# Patient Record
Sex: FEMALE | Race: OTHER | URBAN - METROPOLITAN AREA
[De-identification: names, ages, dates, MRNs, and addresses within clinical notes are randomized per-mention and may not be internally consistent; named-entity substitution may affect disease eponyms.]

---

## 2022-05-02 ENCOUNTER — VIRTUAL VISIT (OUTPATIENT)
Dept: FAMILY MEDICINE CLINIC | Age: 37
End: 2022-05-02

## 2022-05-02 DIAGNOSIS — J39.2 THROAT IRRITATION: ICD-10-CM

## 2022-05-02 DIAGNOSIS — E03.9 HYPOTHYROIDISM, UNSPECIFIED TYPE: Primary | ICD-10-CM

## 2022-05-02 DIAGNOSIS — R14.0 ABDOMINAL BLOATING: ICD-10-CM

## 2022-05-02 PROCEDURE — 99441 PR PHYS/QHP TELEPHONE EVALUATION 5-10 MIN: CPT | Performed by: FAMILY MEDICINE

## 2022-05-02 NOTE — PROGRESS NOTES
Laura Hawkins (: 1985) is a 31 y.o. female, new patient, Virtual Visit for evaluation of the following chief complaint(s):   Sore Throat (x2 month's mild. medication does not improve it.) and Abdominal Pain (x1 week. feels bloated.)       ASSESSMENT/PLAN:  1. Hypothyroidism, unspecified type  She is unsure if it was low or high thyroid. Would like to have these rechecked. Her throat irritation and abdominal bloating are mild but she is wondering if it could be from her thyroid problem. Will have her follow up for exam and labs. 2. Throat irritation  Very mild, will follow up for exam.  Consider allergies. 3. Abdominal bloating  More acute without associated severe sx. Follow up for exam and labs. Return for follow up for F2F next available for exam and labs. .    SUBJECTIVE/OBJECTIVE:  HPI    Sore Throat:  Mild throat irritation, most of the time x 2 months. No cough, nasal congestion, difficulty swallowing, acid reflux. Abdominal bloatin week of feeling bloated off/on. Worse at night. Rarely has nausea. No fever, diarrhea, constipation. Thyroid problem: Dx w labs 10/2020, took medication for a while but stopped over a year ago. Labs were here in [address] at another clinic. FHx:  DM, HTN  PMHx:  Cholecystectomy ()  No pregnancies. Review of Systems     Patient-Reported LMP: 24 approxiamtely. Physical Exam    On this date 2022 I have spent 10 minutes reviewing previous notes, test results and face to face (virtual) with the patient discussing the diagnosis and importance of compliance with the treatment plan as well as documenting on the day of the visit. Laura Hawkins, was evaluated through a synchronous (real-time) audio-video encounter. The patient (or guardian if applicable) is aware that this is a billable service, which includes applicable co-pays. Verbal consent to proceed has been obtained.  The visit was conducted pursuant to the emergency declaration under the [facility], [facility], [facility] authority and the [employer] Act. Patient identification was verified, and a caregiver was present when appropriate. The patient was located at home in a state where the provider was licensed to provide care. Patient identification was verified at the start of the visit: YES    Services were provided through a phone synchronous discussion virtually to substitute for in-person clinic visit. Patient was located at home and provider was located in office or at home. An electronic signature was used to authenticate this note.   -- Faustino Peters MD

## 2022-05-02 NOTE — PROGRESS NOTES
Intake with [facility]  # [id number]. No vs equipment is available. Pt stated she is not taking any medication. The pt has a hx of taking Levothyroxine. She stated she did not have a Dr and stopped taking the medication.

## 2022-08-09 ENCOUNTER — OFFICE VISIT (OUTPATIENT)
Dept: FAMILY MEDICINE CLINIC | Age: 37
End: 2022-08-09

## 2022-08-09 ENCOUNTER — HOSPITAL ENCOUNTER (OUTPATIENT)
Dept: LAB | Age: 37
Discharge: HOME | End: 2022-08-09

## 2022-08-09 VITALS
TEMPERATURE: 97.7 F | WEIGHT: 157 LBS | SYSTOLIC BLOOD PRESSURE: 123 MMHG | OXYGEN SATURATION: 97 % | BODY MASS INDEX: 31.65 KG/M2 | DIASTOLIC BLOOD PRESSURE: 77 MMHG | HEART RATE: 75 BPM | HEIGHT: 59 IN

## 2022-08-09 DIAGNOSIS — G44.41 INTRACTABLE DRUG-INDUCED HEADACHE, NOT ELSEWHERE CLASSIFIED: ICD-10-CM

## 2022-08-09 DIAGNOSIS — E03.9 HYPOTHYROIDISM, UNSPECIFIED TYPE: ICD-10-CM

## 2022-08-09 DIAGNOSIS — E03.9 HYPOTHYROIDISM, UNSPECIFIED TYPE: Primary | ICD-10-CM

## 2022-08-09 LAB
ALBUMIN SERPL-MCNC: 4.3 G/DL (ref 3.5–5)
ALBUMIN/GLOB SERPL: 1.3 {RATIO} (ref 1.1–2.2)
ALP SERPL-CCNC: 121 U/L (ref 45–117)
ALT SERPL-CCNC: 78 U/L (ref 12–78)
ANION GAP SERPL CALC-SCNC: 5 MMOL/L (ref 5–15)
AST SERPL-CCNC: 26 U/L (ref 15–37)
BASOPHILS # BLD: 0 K/UL (ref 0–0.1)
BASOPHILS NFR BLD: 0 % (ref 0–1)
BILIRUB SERPL-MCNC: 0.3 MG/DL (ref 0.2–1)
BUN SERPL-MCNC: 17 MG/DL (ref 6–20)
BUN/CREAT SERPL: 18 (ref 12–20)
CALCIUM SERPL-MCNC: 9.4 MG/DL (ref 8.5–10.1)
CHLORIDE SERPL-SCNC: 109 MMOL/L (ref 97–108)
CO2 SERPL-SCNC: 24 MMOL/L (ref 21–32)
CREAT SERPL-MCNC: 0.96 MG/DL (ref 0.55–1.02)
DIFFERENTIAL METHOD BLD: NORMAL
EOSINOPHIL # BLD: 0.2 K/UL (ref 0–0.4)
EOSINOPHIL NFR BLD: 3 % (ref 0–7)
ERYTHROCYTE [DISTWIDTH] IN BLOOD BY AUTOMATED COUNT: 11.9 % (ref 11.5–14.5)
GLOBULIN SER CALC-MCNC: 3.4 G/DL (ref 2–4)
GLUCOSE SERPL-MCNC: 93 MG/DL (ref 65–100)
HCT VFR BLD AUTO: 42.7 % (ref 35–47)
HGB BLD-MCNC: 14.1 G/DL (ref 11.5–16)
IMM GRANULOCYTES # BLD AUTO: 0 K/UL (ref 0–0.04)
IMM GRANULOCYTES NFR BLD AUTO: 0 % (ref 0–0.5)
LYMPHOCYTES # BLD: 2.2 K/UL (ref 0.8–3.5)
LYMPHOCYTES NFR BLD: 36 % (ref 12–49)
MCH RBC QN AUTO: 31.6 PG (ref 26–34)
MCHC RBC AUTO-ENTMCNC: 33 G/DL (ref 30–36.5)
MCV RBC AUTO: 95.7 FL (ref 80–99)
MONOCYTES # BLD: 0.5 K/UL (ref 0–1)
MONOCYTES NFR BLD: 8 % (ref 5–13)
NEUTS SEG # BLD: 3.3 K/UL (ref 1.8–8)
NEUTS SEG NFR BLD: 53 % (ref 32–75)
NRBC # BLD: 0 K/UL (ref 0–0.01)
NRBC BLD-RTO: 0 PER 100 WBC
PLATELET # BLD AUTO: 389 K/UL (ref 150–400)
PMV BLD AUTO: 8.9 FL (ref 8.9–12.9)
POTASSIUM SERPL-SCNC: 4.6 MMOL/L (ref 3.5–5.1)
PROT SERPL-MCNC: 7.7 G/DL (ref 6.4–8.2)
RBC # BLD AUTO: 4.46 M/UL (ref 3.8–5.2)
SODIUM SERPL-SCNC: 138 MMOL/L (ref 136–145)
TSH SERPL DL<=0.05 MIU/L-ACNC: 3.54 UIU/ML (ref 0.36–3.74)
WBC # BLD AUTO: 6.1 K/UL (ref 3.6–11)

## 2022-08-09 PROCEDURE — 84443 ASSAY THYROID STIM HORMONE: CPT

## 2022-08-09 PROCEDURE — 80053 COMPREHEN METABOLIC PANEL: CPT

## 2022-08-09 PROCEDURE — 85025 COMPLETE CBC W/AUTO DIFF WBC: CPT

## 2022-08-09 PROCEDURE — 99214 OFFICE O/P EST MOD 30 MIN: CPT | Performed by: NURSE PRACTITIONER

## 2022-08-09 NOTE — PROGRESS NOTES
I have printed AVS and reviewed it with patient today. Patient verbalized understanding. I instructed patient to schedule a follow-up appointment prior to leaving today. Patient verbalized understanding. I explained to the patient to bring the vitamins that help her headaches per Dr. Rianna Sampson. Patient verbalized understanding. Patient correctly stated her full name and date of birth prior to the information shared.  [id number] with the [facility] services assisted with this discharge.   Bertha Rodriguez RN

## 2022-08-09 NOTE — PROGRESS NOTES
Coordination of Care  1. Have you been to the ER, urgent care clinic since your last visit? Hospitalized since your last visit? No    2. Have you seen or consulted any other health care providers outside of the [address] [place] [address] since your last visit? Include any pap smears or colon screening. No    Does the patient need refills? NO    Learning Assessment Complete?  yes  Depression Screening complete in the past 12 months? yes

## 2022-08-09 NOTE — PROGRESS NOTES
2022 : Jayshree Torres (: 1985) is a 30 y.o. female,  established patient, here for evaluation of the following chief complaint(s): Other (Please check depression screening), Thyroid Problem (This is a new patient with Hx of Thyroid since 10/2021. Pt is not taking medicines. Pt had VV on  with Dr Ashly Hickman), and Headache (X several years)     ASSESSMENT/PLAN:  Below is the assessment and plan developed based on review of pertinent history, physical exam, labs, studies, and medications. 1. Hypothyroidism, unspecified type  -     TSH 3RD GENERATION; Future  -     CBC WITH AUTOMATED DIFF; Future  -     METABOLIC PANEL, COMPREHENSIVE; Future  2. Intractable drug-induced headache, not elsewhere classified  Return for LK F2F 3-4 weeks (thyroid, headaches). Differential would include medication overuse headache (daily use of Tylenol or Ibuprofen). PHQ 2 = 2.  SUBJECTIVE/OBJECTIVE:  HPI   She bought some vitamins that she thinks reduces the headaches but headaches return when she stops them. Headaches  For many years. Having them more frequently. Also having pain down in the anterior neck. Headache may be on the right or on the left. Also it goes to the back of the head. Pain is up to 8/10. Right now it is 2/10. She takes tylenol or ibuprofen. Sometimes has headache for a whole week or 2 days out of a week. Has not started or stopped bcp. Drinks 2 cups coffee a day. No sodas. 2 years ago she was drinking more than 2 cups of coffee a day. Headaches worse at the end of the day. Has coffee in am and pm.  Previously the evening coffee helped but more recently it has not. Takes tylenol or ibuprofen more than once a day. Sleeps well. But still feels tired in the mornings. No difficulty swallowing. No enlargement of thyroid noted. No sneezing, watery eyes. Thyroid  Throat pain for 2 months. Thought it was her AC. Felt run down, low energy. Severe headaches.   Had some blood tests done. Founnd out there was a problem with her thyroid. No results found for any visits on 08/09/22. Review of Systems: Negative for: fever, chest pain, shortness of breath, leg swelling. Social History:  reports that she has never smoked. She has never used smokeless tobacco. She reports that she does not drink alcohol and does not use drugs. Current Medications:   No current outpatient medications on file. Physical Examination: Patient's last menstrual period was 07/15/2022. Blood pressure 123/77, pulse 75, temperature 97.7 °F (36.5 °C), temperature source Temporal, height 4' 10.86\" (1.495 m), weight 157 lb (71.2 kg), last menstrual period 07/15/2022, SpO2 97 %. General appearance - well developed, no acute distress. Oropharynx - + postnasal drainage noted. No thyromegaly or nodules noted, no adenopathy. Chest - clear to auscultation. Heart - regular rate and rhythm without murmurs, rubs, or gallops. Abdomen - bowel sounds present x 4, NT, ND  Extremities - no CCE. CN II-XII grossly intact. An electronic signature was used to authenticate this note.   -- Vladislav Vigil NP

## 2022-09-02 ENCOUNTER — OFFICE VISIT (OUTPATIENT)
Dept: FAMILY MEDICINE CLINIC | Age: 37
End: 2022-09-02

## 2022-09-02 VITALS
OXYGEN SATURATION: 99 % | DIASTOLIC BLOOD PRESSURE: 78 MMHG | WEIGHT: 160 LBS | HEIGHT: 59 IN | HEART RATE: 63 BPM | BODY MASS INDEX: 32.25 KG/M2 | SYSTOLIC BLOOD PRESSURE: 120 MMHG | TEMPERATURE: 98.2 F

## 2022-09-02 DIAGNOSIS — G43.709 CHRONIC MIGRAINE WITHOUT AURA WITHOUT STATUS MIGRAINOSUS, NOT INTRACTABLE: Primary | ICD-10-CM

## 2022-09-02 PROCEDURE — 99214 OFFICE O/P EST MOD 30 MIN: CPT | Performed by: NURSE PRACTITIONER

## 2022-09-02 RX ORDER — AMITRIPTYLINE HYDROCHLORIDE 10 MG/1
10 TABLET, FILM COATED ORAL
Qty: 30 TABLET | Refills: 1 | Status: SHIPPED | OUTPATIENT
Start: 2022-09-02 | End: 2022-10-14 | Stop reason: SDUPTHER

## 2022-09-02 NOTE — PROGRESS NOTES
Subjective  Meaghan Allen is a 30 y.o. female. Here for thyroid and headache. Patient states that she is still experiencing headaches. Pain is located on the right side of her head. Headaches occur 3 to 4 times a day and last all day. Pain radiates down the back of her neck and is accompanied by nausea. Patient has been taking Tylenol 500 mg PO for headaches and Neuro [employer] vitamin B-B6-B12 that she recieves from the [employer]". Patient stated she use to were glasses but does not currently. Does not see an opthalmologist currently     Review of Systems   Constitutional:  Negative for diaphoresis, fever and malaise/fatigue. HENT:  Negative for congestion and sinus pain. Eyes:  Positive for blurred vision. Negative for photophobia and pain. Respiratory:  Negative for shortness of breath. Cardiovascular:  Negative for chest pain and palpitations. Gastrointestinal:  Positive for nausea. Negative for vomiting. Musculoskeletal:  Positive for neck pain. Neurological:  Positive for headaches. Negative for dizziness, sensory change, speech change, loss of consciousness and weakness. Psychiatric/Behavioral:  Negative for depression and memory loss. The patient is not nervous/anxious. Objective  Blood pressure 120/78, pulse 63, temperature 98.2 °F (36.8 °C), temperature source Temporal, height 4' 10.86\" (1.495 m), weight 160 lb (72.6 kg), last menstrual period 08/18/2022, SpO2 99 %. Physical Exam  Constitutional:       Appearance: Normal appearance. She is normal weight. HENT:      Head: Normocephalic. Cardiovascular:      Rate and Rhythm: Normal rate and regular rhythm. Heart sounds: Normal heart sounds. Pulmonary:      Effort: Pulmonary effort is normal.      Breath sounds: Normal breath sounds. Musculoskeletal:         General: No tenderness. Cervical back: No tenderness. Neurological:      General: No focal deficit present.       Mental Status: She is alert and oriented to person, place, and time. Cranial Nerves: No cranial nerve deficit. Sensory: No sensory deficit. Motor: No weakness. Coordination: Coordination normal.      Gait: Gait normal.   Psychiatric:         Mood and Affect: Mood normal.         Thought Content: Thought content normal.     Lab Results   Component Value Date/Time    TSH 3.54 08/09/2022 12:00 PM       Assessment & Plan    ICD-10-CM ICD-9-CM    1. Chronic migraine without aura without status migrainosus, not intractable  G43.709 346.70 amitriptyline (ELAVIL) 10 mg tablet        Encounter Diagnoses   Name Primary? Chronic migraine without aura without status migrainosus, not intractable Yes   Patient given resources to Opthalmologist for eye exam  Follow up in 4 weeks for headache    Burnie Saint, NP  I personally saw and examined the patient. I have added to the physical exam:  Neck:  No thyromegaly, no thyroid masses, no thyroid tenderness. No cervical adenopathy. I have reviewed and agree with the NP student's findings, including all diagnostic interpretations, and plans as written. I was present during the key portions of separately billed procedures.

## 2022-09-02 NOTE — PROGRESS NOTES
Coordination of Care  1. Have you been to the ER, urgent care clinic since your last visit? Hospitalized since your last visit? No    2. Have you seen or consulted any other health care providers outside of the [address] [place] [address] since your last visit? Include any pap smears or colon screening. No    Does the patient need refills? no    Learning Assessment Complete?  yes  Depression Screening complete in the past 12 months? yes

## 2022-09-02 NOTE — PROGRESS NOTES
AVS printed and with the assistance of [employer] interpretor #[id number], reviewed with patient. Per Ritu's instructions, patient given copy of inexpensive vision resources. Advised patient that new medication sent to [facility] and no coupon is needed. Advised patient to follow-up with Jesse Mcclendon in 1 month for migraines. Patient indicated understanding and appreciated the service today.

## 2022-09-02 NOTE — PROGRESS NOTES
I personally saw and examined the patient. I have added to the physical exam:  Neck:  No thyromegaly, no thyroid masses, no thyroid tenderness. No cervical adenopathy. I have reviewed and agree with the NP student's findings, including all diagnostic interpretations, and plans as written. I was present during the key portions of separately billed procedures. Kimberly Boss NP    Diagnoses and all orders for this visit:    1. Chronic migraine without aura without status migrainosus, not intractable  -     amitriptyline (ELAVIL) 10 mg tablet; Take 1 Tablet by mouth nightly. To prevent migraines. Blood pressure 120/78, pulse 63, temperature 98.2 °F (36.8 °C), temperature source Temporal, height 4' 10.86\" (1.495 m), weight 160 lb (72.6 kg), last menstrual period 08/18/2022, SpO2 99 %. Education: discussed thyroid hormone testing was normal.  No treatment is needed. Glucose < 100, no diabetes.   Lab Results   Component Value Date/Time    Glucose 93 08/09/2022 12:00 PM     Lab Results   Component Value Date/Time    TSH 3.54 08/09/2022 12:00 PM         Kimberly Boss NP  Return F2F LK 4 weeks

## 2022-10-14 ENCOUNTER — OFFICE VISIT (OUTPATIENT)
Dept: FAMILY MEDICINE CLINIC | Age: 37
End: 2022-10-14

## 2022-10-14 VITALS
BODY MASS INDEX: 33.28 KG/M2 | TEMPERATURE: 97.9 F | HEART RATE: 55 BPM | SYSTOLIC BLOOD PRESSURE: 113 MMHG | OXYGEN SATURATION: 98 % | DIASTOLIC BLOOD PRESSURE: 83 MMHG | WEIGHT: 164 LBS

## 2022-10-14 DIAGNOSIS — G43.709 CHRONIC MIGRAINE WITHOUT AURA WITHOUT STATUS MIGRAINOSUS, NOT INTRACTABLE: Primary | ICD-10-CM

## 2022-10-14 DIAGNOSIS — Z13.9 ENCOUNTER FOR SCREENING: ICD-10-CM

## 2022-10-14 DIAGNOSIS — Z23 ENCOUNTER FOR IMMUNIZATION: ICD-10-CM

## 2022-10-14 LAB
HCG URINE, QL. (POC): NEGATIVE
VALID INTERNAL CONTROL?: YES

## 2022-10-14 PROCEDURE — 81025 URINE PREGNANCY TEST: CPT | Performed by: NURSE PRACTITIONER

## 2022-10-14 PROCEDURE — 90686 IIV4 VACC NO PRSV 0.5 ML IM: CPT

## 2022-10-14 PROCEDURE — 99214 OFFICE O/P EST MOD 30 MIN: CPT | Performed by: NURSE PRACTITIONER

## 2022-10-14 PROCEDURE — 90471 IMMUNIZATION ADMIN: CPT

## 2022-10-14 RX ORDER — AMITRIPTYLINE HYDROCHLORIDE 10 MG/1
10 TABLET, FILM COATED ORAL
Qty: 30 TABLET | Refills: 2 | Status: SHIPPED | OUTPATIENT
Start: 2022-10-14

## 2022-10-14 NOTE — PROGRESS NOTES
10/14/2022 : Britton Eaton (: 1985) is a 30 y.o. female,  established patient, here for evaluation of the following chief complaint(s):  Migraine (Follow up)     ASSESSMENT/PLAN:  Below is the assessment and plan developed based on review of pertinent history, physical exam, labs, studies, and medications. 1. Chronic migraine without aura without status migrainosus, not intractable  -     amitriptyline (ELAVIL) 10 mg tablet; Take 1 Tablet by mouth nightly. To prevent migraines. , Normal, Disp-30 Tablet, R-2  2. Encounter for screening  -     AMB POC URINE PREGNANCY TEST, VISUAL COLOR COMPARISON  3. Encounter for immunization  -     INFLUENZA, FLUARIX, FLULAVAL, FLUZONE (AGE 6 MO+), AFLURIA(AGE 3Y+) IM, PF, 0.5 ML    No follow-ups on file. SUBJECTIVE/OBJECTIVE:  HPI Migraines are better, sleeping is good. She had almost every day migraines. This month 4 consecutive days. She is trying to be pregnant. Results for orders placed or performed in visit on 10/14/22   AMB POC URINE PREGNANCY TEST, VISUAL COLOR COMPARISON   Result Value Ref Range    VALID INTERNAL CONTROL POC Yes     HCG urine, Ql. (POC) Negative Negative     Review of Systems: Negative for: fever, chest pain, shortness of breath, leg swelling. Social History:  reports that she has never smoked. She has never used smokeless tobacco. She reports that she does not drink alcohol and does not use drugs. Current Medications:   Current Outpatient Medications   Medication Sig    amitriptyline (ELAVIL) 10 mg tablet Take 1 Tablet by mouth nightly. To prevent migraines. Physical Examination: Patient's last menstrual period was 08/15/2022. Blood pressure 113/83, pulse (!) 55, temperature 97.9 °F (36.6 °C), temperature source Temporal, weight 164 lb (74.4 kg), last menstrual period 08/15/2022, SpO2 98 %. General appearance - well developed, no acute distress. Chest - clear to auscultation.   Heart - regular rate and rhythm without murmurs, rubs, or gallops. Abdomen - bowel sounds present x 4, NT, ND  Extremities - no CCE. An electronic signature was used to authenticate this note.   -- Paul Lira NP

## 2022-10-14 NOTE — PROGRESS NOTES
Jane Salguero  completed screening documentation. Patient asking for the flue vaccine today. No contraindications for administering vaccines listed or stated. Immunization  given per policy. Entered  Into [employer] Information System. Vaccine Immunization Statement(s) given and instructions regarding adverse reaction. Instructed to go to Emergency Dept iIf rash or swelling of face or shortness of breath appeared. Also instructed to wait 10-15 min at site to observe for reaction. No adverse reaction noted at time of discharge. An After Visit Summary was printed and reviewed with the patient. Informed patient to give name and date of birth when picking up medication. Patient verbalized understanding. Sammy Ortega        .

## 2022-10-14 NOTE — PROGRESS NOTES
Coordination of Care  1. Have you been to the ER, urgent care clinic since your last visit? Hospitalized since your last visit? No    2. Have you seen or consulted any other health care providers outside of the [address] [place] [address] since your last visit? Include any pap smears or colon screening. No    Does the patient need refills?  N/A    Learning Assessment Complete? yes  Results for orders placed or performed in visit on 10/14/22   AMB POC URINE PREGNANCY TEST, VISUAL COLOR COMPARISON   Result Value Ref Range    VALID INTERNAL CONTROL POC Yes     HCG urine, Ql. (POC) Negative Negative